# Patient Record
Sex: FEMALE | Race: WHITE | NOT HISPANIC OR LATINO | Employment: FULL TIME | URBAN - METROPOLITAN AREA
[De-identification: names, ages, dates, MRNs, and addresses within clinical notes are randomized per-mention and may not be internally consistent; named-entity substitution may affect disease eponyms.]

---

## 2023-07-07 LAB
CLUE CELLS: PRESENT
NUGENT SCORE: 8
YEAST: ABNORMAL

## 2023-08-20 PROBLEM — J32.2 CHRONIC ETHMOIDAL SINUSITIS: Status: ACTIVE | Noted: 2023-08-20

## 2023-08-20 PROBLEM — J35.1 TONSILLAR HYPERTROPHY: Status: ACTIVE | Noted: 2023-08-20

## 2023-08-20 PROBLEM — J34.3 NASAL TURBINATE HYPERTROPHY: Status: ACTIVE | Noted: 2023-08-20

## 2023-08-20 PROBLEM — F41.8 ANXIETY WITH DEPRESSION: Status: ACTIVE | Noted: 2023-08-20

## 2023-08-20 PROBLEM — N89.8 VAGINAL DISCHARGE: Status: ACTIVE | Noted: 2023-08-20

## 2023-08-20 PROBLEM — R10.2 VAGINAL PAIN: Status: ACTIVE | Noted: 2023-08-20

## 2023-08-20 PROBLEM — Z20.822 SUSPECTED COVID-19 VIRUS INFECTION: Status: ACTIVE | Noted: 2023-08-20

## 2023-08-20 PROBLEM — F32.A DEPRESSION: Status: ACTIVE | Noted: 2023-08-20

## 2023-08-20 PROBLEM — J30.9 ALLERGIC RHINITIS: Status: ACTIVE | Noted: 2023-08-20

## 2023-08-20 PROBLEM — N92.0 MENORRHAGIA: Status: ACTIVE | Noted: 2023-08-20

## 2023-08-20 PROBLEM — N89.8 VAGINAL ODOR: Status: ACTIVE | Noted: 2023-08-20

## 2023-08-20 PROBLEM — B96.89 BACTERIAL VAGINOSIS: Status: ACTIVE | Noted: 2023-08-20

## 2023-08-20 PROBLEM — R43.9 SENSE OF SMELL ALTERED: Status: ACTIVE | Noted: 2023-08-20

## 2023-08-20 PROBLEM — B00.1 RECURRENT COLD SORES: Status: ACTIVE | Noted: 2023-08-20

## 2023-08-20 PROBLEM — R05.8 PRODUCTIVE COUGH: Status: ACTIVE | Noted: 2023-08-20

## 2023-08-20 PROBLEM — N92.1 BREAKTHROUGH BLEEDING ON OCPS: Status: ACTIVE | Noted: 2023-08-20

## 2023-08-20 PROBLEM — N76.0 VAGINITIS: Status: ACTIVE | Noted: 2023-08-20

## 2023-08-20 PROBLEM — B94.8 POST-VIRAL DISORDER: Status: ACTIVE | Noted: 2023-08-20

## 2023-08-20 PROBLEM — Z97.5 IUD (INTRAUTERINE DEVICE) IN PLACE: Status: ACTIVE | Noted: 2023-08-20

## 2023-08-20 PROBLEM — R10.2 PELVIC CRAMPING: Status: ACTIVE | Noted: 2023-08-20

## 2023-08-20 PROBLEM — J34.2 NASAL SEPTAL DEVIATION: Status: ACTIVE | Noted: 2023-08-20

## 2023-08-20 PROBLEM — F42.9 OCD (OBSESSIVE COMPULSIVE DISORDER): Status: ACTIVE | Noted: 2023-08-20

## 2023-08-20 PROBLEM — N76.0 BACTERIAL VAGINOSIS: Status: ACTIVE | Noted: 2023-08-20

## 2023-08-20 PROBLEM — R43.2 ALTERED TASTE: Status: ACTIVE | Noted: 2023-08-20

## 2023-08-20 PROBLEM — R44.2 PHANTOSMIA: Status: ACTIVE | Noted: 2023-08-20

## 2023-08-20 PROBLEM — Q74.2 ACCESSORY NAVICULAR BONE OF RIGHT FOOT: Status: ACTIVE | Noted: 2023-08-20

## 2023-08-20 PROBLEM — J03.91 RECURRENT TONSILLITIS: Status: ACTIVE | Noted: 2023-08-20

## 2023-08-20 PROBLEM — U09.9 POST-COVID SYNDROME: Status: ACTIVE | Noted: 2023-08-20

## 2023-08-20 PROBLEM — J06.9 RECURRENT URI (UPPER RESPIRATORY INFECTION): Status: ACTIVE | Noted: 2023-08-20

## 2023-08-20 PROBLEM — N90.7 INCLUSION CYST OF VULVA: Status: ACTIVE | Noted: 2023-08-20

## 2023-08-20 RX ORDER — QUETIAPINE FUMARATE 100 MG/1
1 TABLET, FILM COATED ORAL NIGHTLY
COMMUNITY

## 2023-08-20 RX ORDER — HYDROXYZINE PAMOATE 25 MG/1
CAPSULE ORAL
COMMUNITY
Start: 2022-02-11

## 2023-08-20 RX ORDER — BUPROPION HYDROCHLORIDE 300 MG/1
1 TABLET ORAL DAILY
COMMUNITY
Start: 2021-01-18

## 2023-08-20 RX ORDER — DROSPIRENONE AND ETHINYL ESTRADIOL 0.02-3(28)
1 KIT ORAL DAILY
COMMUNITY

## 2023-08-20 RX ORDER — VALACYCLOVIR HYDROCHLORIDE 500 MG/1
1 TABLET, FILM COATED ORAL DAILY
COMMUNITY

## 2023-08-20 RX ORDER — LEVONORGESTREL / ETHINYL ESTRADIOL AND ETHINYL ESTRADIOL 150-30(84)
1 KIT ORAL DAILY
COMMUNITY
Start: 2022-12-29 | End: 2023-12-29

## 2023-08-20 RX ORDER — ALBUTEROL SULFATE 90 UG/1
1-2 AEROSOL, METERED RESPIRATORY (INHALATION) AS NEEDED
COMMUNITY
Start: 2022-12-20

## 2023-08-20 RX ORDER — NORETHINDRONE ACETATE AND ETHINYL ESTRADIOL 1MG-20(24)
1 KIT ORAL DAILY
COMMUNITY

## 2023-08-20 RX ORDER — GUANFACINE 1 MG/1
1 TABLET, EXTENDED RELEASE ORAL NIGHTLY
COMMUNITY
Start: 2023-02-10

## 2023-08-20 RX ORDER — METHYLPHENIDATE HYDROCHLORIDE 10 MG/1
1 TABLET ORAL 2 TIMES DAILY
COMMUNITY
Start: 2023-02-22

## 2023-08-20 RX ORDER — METRONIDAZOLE 500 MG/1
1 TABLET ORAL 2 TIMES DAILY
COMMUNITY
Start: 2023-01-13

## 2023-08-20 RX ORDER — METRONIDAZOLE 7.5 MG/G
GEL VAGINAL NIGHTLY
COMMUNITY
Start: 2022-10-12

## 2023-08-20 RX ORDER — FLUVOXAMINE MALEATE 100 MG/1
1 TABLET, COATED ORAL NIGHTLY
COMMUNITY

## 2023-11-09 ENCOUNTER — APPOINTMENT (OUTPATIENT)
Dept: OBSTETRICS AND GYNECOLOGY | Facility: CLINIC | Age: 23
End: 2023-11-09
Payer: COMMERCIAL

## 2023-11-09 NOTE — PROGRESS NOTES
Assessment/Plan     22 y.o. female here for -     Birth Control Surveillance  - Currently taking Lay OCP, pt happy with this method of contraception  - The risks of clotting with birth control containing estrogen was discussed with the patient. She denies a personal history of smoking, migraine with aura, blood clotting and hypertension. She denies having any relatives with a history of DVT or PE, and any relatives less than 50 years old with a history of MI or CVA.   - Patient aware of risks and consents to continuing this method. Refill Rx sent to pt preferred pharmacy.    RTO yearly for annual exam, or sooner as needed.    JL Becerra     Subjective     Smitha Mccabe is a 22 y.o. female presenting for BC refill.  She has been on Lay OCP since 7/6/23. Previously she had been taking Seasonique, but had switched to Lay in hopes to improve acne.     Objective     There were no vitals taken for this visit.    Physical Exam

## 2023-12-12 DIAGNOSIS — Z30.09 BIRTH CONTROL COUNSELING: ICD-10-CM

## 2023-12-29 RX ORDER — LEVONORGESTREL / ETHINYL ESTRADIOL AND ETHINYL ESTRADIOL 150-30(84)
1 KIT ORAL DAILY
Qty: 91 TABLET | Refills: 0 | Status: SHIPPED | OUTPATIENT
Start: 2023-12-29

## 2024-05-15 ENCOUNTER — APPOINTMENT (OUTPATIENT)
Dept: OBSTETRICS AND GYNECOLOGY | Facility: CLINIC | Age: 24
End: 2024-05-15
Payer: COMMERCIAL

## 2024-05-29 ENCOUNTER — APPOINTMENT (OUTPATIENT)
Dept: OBSTETRICS AND GYNECOLOGY | Facility: CLINIC | Age: 24
End: 2024-05-29
Payer: COMMERCIAL

## 2024-06-26 ENCOUNTER — APPOINTMENT (OUTPATIENT)
Dept: OBSTETRICS AND GYNECOLOGY | Facility: CLINIC | Age: 24
End: 2024-06-26
Payer: COMMERCIAL

## 2024-08-13 ENCOUNTER — OFFICE VISIT (OUTPATIENT)
Dept: OBSTETRICS AND GYNECOLOGY | Facility: CLINIC | Age: 24
End: 2024-08-13
Payer: COMMERCIAL

## 2024-08-13 VITALS
HEIGHT: 62 IN | SYSTOLIC BLOOD PRESSURE: 124 MMHG | DIASTOLIC BLOOD PRESSURE: 83 MMHG | BODY MASS INDEX: 30.11 KG/M2 | WEIGHT: 163.6 LBS

## 2024-08-13 DIAGNOSIS — Z01.419 WELL WOMAN EXAM WITH ROUTINE GYNECOLOGICAL EXAM: Primary | ICD-10-CM

## 2024-08-13 DIAGNOSIS — N76.0 ACUTE VAGINITIS: ICD-10-CM

## 2024-08-13 DIAGNOSIS — Z30.017 NEXPLANON INSERTION: ICD-10-CM

## 2024-08-13 PROBLEM — N92.1 BREAKTHROUGH BLEEDING ON OCPS: Status: RESOLVED | Noted: 2023-08-20 | Resolved: 2024-08-13

## 2024-08-13 PROBLEM — N89.8 VAGINAL ODOR: Status: RESOLVED | Noted: 2023-08-20 | Resolved: 2024-08-13

## 2024-08-13 PROBLEM — Z97.5 IUD (INTRAUTERINE DEVICE) IN PLACE: Status: RESOLVED | Noted: 2023-08-20 | Resolved: 2024-08-13

## 2024-08-13 PROBLEM — N90.7 INCLUSION CYST OF VULVA: Status: RESOLVED | Noted: 2023-08-20 | Resolved: 2024-08-13

## 2024-08-13 PROBLEM — B96.89 BACTERIAL VAGINOSIS: Status: RESOLVED | Noted: 2023-08-20 | Resolved: 2024-08-13

## 2024-08-13 PROBLEM — N89.8 VAGINAL DISCHARGE: Status: RESOLVED | Noted: 2023-08-20 | Resolved: 2024-08-13

## 2024-08-13 PROBLEM — N92.0 MENORRHAGIA: Status: RESOLVED | Noted: 2023-08-20 | Resolved: 2024-08-13

## 2024-08-13 PROBLEM — R10.2 VAGINAL PAIN: Status: RESOLVED | Noted: 2023-08-20 | Resolved: 2024-08-13

## 2024-08-13 LAB — PREGNANCY TEST URINE, POC: NEGATIVE

## 2024-08-13 PROCEDURE — 11981 INSERTION DRUG DLVR IMPLANT: CPT | Performed by: OBSTETRICS & GYNECOLOGY

## 2024-08-13 PROCEDURE — 87591 N.GONORRHOEAE DNA AMP PROB: CPT

## 2024-08-13 PROCEDURE — 99395 PREV VISIT EST AGE 18-39: CPT | Performed by: OBSTETRICS & GYNECOLOGY

## 2024-08-13 PROCEDURE — 81025 URINE PREGNANCY TEST: CPT | Performed by: OBSTETRICS & GYNECOLOGY

## 2024-08-13 PROCEDURE — 3008F BODY MASS INDEX DOCD: CPT | Performed by: OBSTETRICS & GYNECOLOGY

## 2024-08-13 PROCEDURE — 87491 CHLMYD TRACH DNA AMP PROBE: CPT

## 2024-08-13 RX ORDER — VENLAFAXINE HYDROCHLORIDE 150 MG/1
CAPSULE, EXTENDED RELEASE ORAL
COMMUNITY
End: 2024-08-13 | Stop reason: WASHOUT

## 2024-08-13 RX ORDER — CLOMIPRAMINE HYDROCHLORIDE 25 MG/1
25 CAPSULE ORAL NIGHTLY
COMMUNITY
Start: 2024-07-11

## 2024-08-13 RX ORDER — LEVONORGESTREL AND ETHINYL ESTRADIOL 0.15-0.03
1 KIT ORAL
COMMUNITY
End: 2024-08-13 | Stop reason: WASHOUT

## 2024-08-13 RX ORDER — NORETHINDRONE ACETATE AND ETHINYL ESTRADIOL .02; 1 MG/1; MG/1
TABLET ORAL
COMMUNITY
End: 2024-08-13 | Stop reason: WASHOUT

## 2024-08-13 RX ORDER — LIDOCAINE HYDROCHLORIDE 10 MG/ML
2 INJECTION, SOLUTION EPIDURAL; INFILTRATION; INTRACAUDAL; PERINEURAL ONCE
Status: COMPLETED | OUTPATIENT
Start: 2024-08-13 | End: 2024-08-13

## 2024-08-13 RX ORDER — LAMOTRIGINE 25 MG/1
TABLET ORAL
COMMUNITY
Start: 2024-03-27 | End: 2024-08-13 | Stop reason: WASHOUT

## 2024-08-13 ASSESSMENT — PATIENT HEALTH QUESTIONNAIRE - PHQ9
2. FEELING DOWN, DEPRESSED OR HOPELESS: NOT AT ALL
1. LITTLE INTEREST OR PLEASURE IN DOING THINGS: NOT AT ALL
SUM OF ALL RESPONSES TO PHQ9 QUESTIONS 1 & 2: 0

## 2024-08-13 NOTE — PROGRESS NOTES
Subjective   Patient ID: Smitha Mccabe is a 23 y.o. female who presents for Annual Exam.    Last pap: 10/10/22 neg  Last mamm: n/a  LMP:08/03/2024  Contraception: no  Sexually active: yes, a little  Self breast exam: no  Diet: balanced  Exercise: yes,sometimes    HPI  Doing well. No complaints. Wants to discuss birth control     Review of Systems    Objective   Physical Exam  Physical Exam         Appearance: Normal appearance. Affect normal and alert  Pulmonary:      Effort: Pulmonary effort is normal. Breath sounds clear  Skin: no rashes or lesions   Breasts:     Breasts bilaterally are symmetrical. No masses or axillary adenopathy. No skin or nipple changes    Abdominal:     Abdomen is flat, soft, nontender. No distension. No mass palpated.      Genitourinary:     Labia: no skin lesions or rash       Urethra: No lesions.      Bladder with no prolapse     Vagina: No discharge, mucosa is pink with no lesions.     Cervix:    mobile and nontender no discharge     Uterus:   Not enlarged, small, nontender.      Adnexa: Bilaterally with  No mass or tenderness.            Extremities:  Nontender, no edema. Normal range of motion    Assessment/Plan   Diagnoses and all orders for this visit:  Well woman exam with routine gynecological exam  Nexplanon insertion  -     POCT pregnancy, urine manually resulted  -     etonogestrel-eluting contraceptive implant  -     lidocaine PF (Xylocaine) 10 mg/mL (1 %) injection 20 mg  Acute vaginitis  -   screen for   C. trachomatis + N. gonorrhoeae, Amplified    We discussed all options for birth control. Oral tablets, Nuva ring, Depoprovera, Nexplanon and both IUD types were discussed. Side effects, risks, insertion, and effectiveness were discussed. All of her questions were answered. She is aware this choice may depend on insurance coverage. She prefers the method: ____Nexplanon- agrees to insert today ____________.      Insertion of Contraceptive Capsule    Date/Time: 8/13/2024  11:08 AM    Performed by: Myrtle Gupta MD  Authorized by: Myrlte Gupta MD    Consent:     Consent obtained:  Written    Consent given by:  Patient    Patient questions answered: yes      Patient agrees, verbalizes understanding, and wants to proceed: yes      Educational handouts given: yes    Indication:     Indication: Insertion of non-biodegradable drug delivery implant    Pre-procedure:     Prepped with: povidone-iodine      Local anesthetic:  Lidocaine 1%    The site was cleaned and prepped in a sterile fashion: yes    Procedure:     Procedure:  Insertion    Left/right:  Left    Preloaded contraceptive capsule trocar was placed subdermally: yes      Visualization of implant was obtained: yes      Contraceptive capsule was inserted and trocar removed: yes      Visualization of notch in stylet and palpation of device: yes      Palpation confirms placement by provider and patient: yes      Site was closed with steri-strips and pressure bandage applied: yes    Comments:      Tolerated procedure well         MD KINA Waller MA 08/13/24 8:50 AM

## 2024-08-14 LAB
C TRACH RRNA SPEC QL NAA+PROBE: NEGATIVE
N GONORRHOEA DNA SPEC QL PROBE+SIG AMP: NEGATIVE

## 2024-10-19 ENCOUNTER — PATIENT MESSAGE (OUTPATIENT)
Dept: OBSTETRICS AND GYNECOLOGY | Facility: CLINIC | Age: 24
End: 2024-10-19
Payer: COMMERCIAL

## 2024-10-19 DIAGNOSIS — N76.0 BACTERIAL VAGINITIS: Primary | ICD-10-CM

## 2024-10-19 DIAGNOSIS — B96.89 BACTERIAL VAGINITIS: Primary | ICD-10-CM

## 2024-10-22 RX ORDER — METRONIDAZOLE 500 MG/1
500 TABLET ORAL 2 TIMES DAILY
Qty: 14 TABLET | Refills: 0 | Status: SHIPPED | OUTPATIENT
Start: 2024-10-22 | End: 2024-10-29

## 2025-07-03 ENCOUNTER — OFFICE VISIT (OUTPATIENT)
Dept: URGENT CARE | Age: 25
End: 2025-07-03
Payer: COMMERCIAL

## 2025-07-03 VITALS
HEIGHT: 62 IN | SYSTOLIC BLOOD PRESSURE: 126 MMHG | BODY MASS INDEX: 27.6 KG/M2 | RESPIRATION RATE: 16 BRPM | OXYGEN SATURATION: 99 % | TEMPERATURE: 98.5 F | DIASTOLIC BLOOD PRESSURE: 92 MMHG | WEIGHT: 150 LBS | HEART RATE: 104 BPM

## 2025-07-03 DIAGNOSIS — J01.00 ACUTE NON-RECURRENT MAXILLARY SINUSITIS: Primary | ICD-10-CM

## 2025-07-03 DIAGNOSIS — H65.03 NON-RECURRENT ACUTE SEROUS OTITIS MEDIA OF BOTH EARS: ICD-10-CM

## 2025-07-03 RX ORDER — AMOXICILLIN AND CLAVULANATE POTASSIUM 875; 125 MG/1; MG/1
1 TABLET, FILM COATED ORAL 2 TIMES DAILY
Qty: 20 TABLET | Refills: 0 | Status: SHIPPED | OUTPATIENT
Start: 2025-07-03 | End: 2025-07-13

## 2025-07-03 ASSESSMENT — ENCOUNTER SYMPTOMS: SINUS COMPLAINT: 1

## 2025-07-03 ASSESSMENT — PAIN SCALES - GENERAL: PAINLEVEL_OUTOF10: 8

## 2025-07-03 NOTE — PATIENT INSTRUCTIONS
Antibiotics as directed; take with food  Decongestants, nasal saline as needed  Follow up with new or worsening symptoms

## 2025-07-03 NOTE — PROGRESS NOTES
"Subjective   Patient ID: Smitha Mccabe is a 24 y.o. female. They present today with a chief complaint of Illness (Headache and sinus pressure - Entered by patient) and Sinus Problem (For over a week).    History of Present Illness  Subjective  Smitha Mccabe is a 24 y.o. female who presents for evaluation of symptoms of a URI. Symptoms include bilateral ear pressure/pain, facial pain, post nasal drip, sinus pressure, and sore throat. Onset of symptoms was 8 days ago and has been gradually worsening since that time. No fevers or chills are reported. She denies shortness of breath.        Illness  Sinus Problem      Past Medical History  Allergies as of 07/03/2025    (No Known Allergies)       Prescriptions Prior to Admission[1]     Medical History[2]    Surgical History[3]     reports that she has never smoked. She uses smokeless tobacco. She reports current alcohol use of about 5.0 standard drinks of alcohol per week. She reports that she does not use drugs.    Review of Systems  Review of Systems                               Objective    Vitals:    07/03/25 1107   BP: (!) 126/92   BP Location: Right arm   Patient Position: Sitting   BP Cuff Size: Adult   Pulse: 104   Resp: 16   Temp: 36.9 °C (98.5 °F)   TempSrc: Oral   SpO2: 99%   Weight: 68 kg (150 lb)   Height: 1.575 m (5' 2\")     Patient's last menstrual period was 06/12/2025 (approximate).    Physical Exam  Vitals and nursing note reviewed.   Constitutional:       General: She is not in acute distress.     Appearance: Normal appearance. She is not toxic-appearing.   HENT:      Right Ear: Ear canal and external ear normal. A middle ear effusion is present.      Left Ear: Ear canal and external ear normal. A middle ear effusion is present.      Nose: Congestion and rhinorrhea present.      Mouth/Throat:      Mouth: Mucous membranes are moist.      Pharynx: Postnasal drip present.   Eyes:      General:         Right eye: No discharge.         Left " eye: No discharge.      Extraocular Movements: Extraocular movements intact.      Conjunctiva/sclera: Conjunctivae normal.      Pupils: Pupils are equal, round, and reactive to light.   Cardiovascular:      Rate and Rhythm: Normal rate and regular rhythm.      Pulses: Normal pulses.      Heart sounds: Normal heart sounds.   Pulmonary:      Effort: Pulmonary effort is normal.      Breath sounds: Normal breath sounds. No wheezing, rhonchi or rales.   Musculoskeletal:      Cervical back: Normal range of motion and neck supple. No rigidity or tenderness.   Lymphadenopathy:      Cervical: No cervical adenopathy.   Neurological:      Mental Status: She is alert.         Procedures    Point of Care Test & Imaging Results from this visit  No results found for this visit on 07/03/25.   Imaging  No results found.    Cardiology, Vascular, and Other Imaging  No other imaging results found for the past 2 days      Diagnostic study results (if any) were reviewed by Felisha Mahoney MD.    Assessment/Plan   Allergies, medications, history, and pertinent labs/EKGs/Imaging reviewed by Felisha Mahoney MD.     Medical Decision Making      Orders and Diagnoses  There are no diagnoses linked to this encounter.    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/16/2019    Recurrent cold sores    Other specified health status     No pertinent past medical history    Personal history of other diseases of the female genital tract 08/04/2022    History of vaginal discharge    Personal history of other diseases of the respiratory system 04/13/2015    History of pharyngitis    Personal history of  other diseases of the respiratory system 04/13/2015    History of sinusitis    Personal history of other diseases of the respiratory system 04/13/2015    History of upper respiratory infection    Personal history of other diseases of the respiratory system 02/01/2016    History of pharyngitis    Personal history of other diseases of the respiratory system 12/02/2015    History of streptococcal pharyngitis    Personal history of other diseases of the respiratory system 05/30/2014    History of acute pharyngitis    Personal history of other mental and behavioral disorders     History of depression    Personal history of other mental and behavioral disorders 06/27/2022    History of anxiety   [3]   Past Surgical History:  Procedure Laterality Date    OTHER SURGICAL HISTORY  06/27/2022    Tonsillectomy

## 2025-08-25 ENCOUNTER — PROCEDURE VISIT (OUTPATIENT)
Facility: CLINIC | Age: 25
End: 2025-08-25
Payer: COMMERCIAL

## 2025-08-25 VITALS — SYSTOLIC BLOOD PRESSURE: 114 MMHG | DIASTOLIC BLOOD PRESSURE: 78 MMHG | BODY MASS INDEX: 29.26 KG/M2 | WEIGHT: 160 LBS

## 2025-08-25 DIAGNOSIS — Z30.011 ENCOUNTER FOR INITIAL PRESCRIPTION OF CONTRACEPTIVE PILLS: ICD-10-CM

## 2025-08-25 DIAGNOSIS — Z30.46 NEXPLANON REMOVAL: Primary | ICD-10-CM

## 2025-08-25 PROCEDURE — 99213 OFFICE O/P EST LOW 20 MIN: CPT

## 2025-08-25 RX ORDER — NORETHINDRONE ACETATE AND ETHINYL ESTRADIOL 1MG-20(21)
1 KIT ORAL DAILY
Qty: 84 TABLET | Refills: 4 | Status: SHIPPED | OUTPATIENT
Start: 2025-08-25 | End: 2026-08-25